# Patient Record
Sex: FEMALE | URBAN - METROPOLITAN AREA
[De-identification: names, ages, dates, MRNs, and addresses within clinical notes are randomized per-mention and may not be internally consistent; named-entity substitution may affect disease eponyms.]

---

## 2022-06-16 ENCOUNTER — TELEPHONE (OUTPATIENT)
Dept: ALLERGY | Facility: CLINIC | Age: 27
End: 2022-06-16

## 2022-06-16 NOTE — TELEPHONE ENCOUNTER
[facility] Call Center    Phone Message    May a detailed message be left on voicemail: no     Reason for Call: Other: Pt., Rehana moving back to [place] from [place] wanting to know if we will accept outside serum for allergy shots. Please call [phone]     Action Taken: Message routed to:  [facility] ([facility]): allergy    Travel Screening: Not Applicable

## 2022-06-17 NOTE — TELEPHONE ENCOUNTER
Spoke with patient and informed her that we do not accept outside serums. Verbal understanding and no further questions.    Quinn Buckley, [facility]